# Patient Record
Sex: MALE | URBAN - METROPOLITAN AREA
[De-identification: names, ages, dates, MRNs, and addresses within clinical notes are randomized per-mention and may not be internally consistent; named-entity substitution may affect disease eponyms.]

---

## 2019-11-01 ENCOUNTER — OUTPATIENT (OUTPATIENT)
Dept: OUTPATIENT SERVICES | Facility: HOSPITAL | Age: 64
LOS: 1 days | End: 2019-11-01

## 2019-11-25 DIAGNOSIS — Z71.89 OTHER SPECIFIED COUNSELING: ICD-10-CM

## 2020-10-15 ENCOUNTER — APPOINTMENT (OUTPATIENT)
Dept: PULMONOLOGY | Facility: CLINIC | Age: 65
End: 2020-10-15
Payer: MEDICARE

## 2020-10-15 VITALS
BODY MASS INDEX: 29.72 KG/M2 | OXYGEN SATURATION: 97 % | HEIGHT: 67.13 IN | WEIGHT: 191.6 LBS | TEMPERATURE: 97.5 F | RESPIRATION RATE: 15 BRPM | HEART RATE: 92 BPM | DIASTOLIC BLOOD PRESSURE: 79 MMHG | SYSTOLIC BLOOD PRESSURE: 123 MMHG

## 2020-10-15 DIAGNOSIS — I10 ESSENTIAL (PRIMARY) HYPERTENSION: ICD-10-CM

## 2020-10-15 DIAGNOSIS — R06.83 SNORING: ICD-10-CM

## 2020-10-15 DIAGNOSIS — F32.9 ANXIETY DISORDER, UNSPECIFIED: ICD-10-CM

## 2020-10-15 DIAGNOSIS — Z78.9 OTHER SPECIFIED HEALTH STATUS: ICD-10-CM

## 2020-10-15 DIAGNOSIS — F41.9 ANXIETY DISORDER, UNSPECIFIED: ICD-10-CM

## 2020-10-15 DIAGNOSIS — E78.00 PURE HYPERCHOLESTEROLEMIA, UNSPECIFIED: ICD-10-CM

## 2020-10-15 DIAGNOSIS — F51.02 ADJUSTMENT INSOMNIA: ICD-10-CM

## 2020-10-15 DIAGNOSIS — E11.9 TYPE 2 DIABETES MELLITUS W/OUT COMPLICATIONS: ICD-10-CM

## 2020-10-15 PROBLEM — Z00.00 ENCOUNTER FOR PREVENTIVE HEALTH EXAMINATION: Status: ACTIVE | Noted: 2020-10-15

## 2020-10-15 PROCEDURE — 99204 OFFICE O/P NEW MOD 45 MIN: CPT

## 2020-10-15 RX ORDER — EMPAGLIFLOZIN 25 MG/1
25 TABLET, FILM COATED ORAL
Refills: 0 | Status: ACTIVE | COMMUNITY

## 2020-10-15 RX ORDER — SITAGLIPTIN AND METFORMIN HYDROCHLORIDE 100; 1000 MG/1; MG/1
100-1000 TABLET, FILM COATED, EXTENDED RELEASE ORAL
Refills: 0 | Status: ACTIVE | COMMUNITY

## 2020-10-15 RX ORDER — TRAZODONE HYDROCHLORIDE 50 MG/1
50 TABLET ORAL
Refills: 0 | Status: ACTIVE | COMMUNITY

## 2020-10-15 RX ORDER — INSULIN ASPART 100 [IU]/ML
(70-30) 100 INJECTION, SUSPENSION SUBCUTANEOUS
Refills: 0 | Status: ACTIVE | COMMUNITY

## 2020-10-15 RX ORDER — GABAPENTIN 300 MG/1
300 CAPSULE ORAL
Refills: 0 | Status: ACTIVE | COMMUNITY

## 2020-10-15 RX ORDER — MIRTAZAPINE 15 MG/1
15 TABLET, FILM COATED ORAL
Refills: 0 | Status: ACTIVE | COMMUNITY

## 2020-10-15 RX ORDER — ASPIRIN 81 MG
81 TABLET, DELAYED RELEASE (ENTERIC COATED) ORAL
Refills: 0 | Status: ACTIVE | COMMUNITY

## 2020-10-15 RX ORDER — HYDROXYZINE HYDROCHLORIDE 10 MG/1
10 TABLET ORAL
Refills: 0 | Status: ACTIVE | COMMUNITY

## 2020-10-15 RX ORDER — LOSARTAN POTASSIUM 25 MG/1
25 TABLET, FILM COATED ORAL
Refills: 0 | Status: ACTIVE | COMMUNITY

## 2020-10-15 RX ORDER — ATORVASTATIN CALCIUM 20 MG/1
20 TABLET, FILM COATED ORAL
Refills: 0 | Status: ACTIVE | COMMUNITY

## 2020-10-15 RX ORDER — CARVEDILOL 25 MG/1
25 TABLET, FILM COATED ORAL
Refills: 0 | Status: ACTIVE | COMMUNITY

## 2020-10-15 NOTE — DISCUSSION/SUMMARY
[FreeTextEntry1] : This is a 64 year old Azerbaijani speaking male for evaluation of possible sleep apnea. The patient has multiple signs and symptoms of sleep-disordered breathing include frequent nocturnal awakenings, nonrestorative sleep, witnessed apnea, loud snoring, and EDS. He also has worsening HTN and diabetes, CVD and is at high risk for a heart attack and stroke. He was referred to the [facility] for a diagnostic PSG. The ramifications of MELO and its potential therapeutic modalities were discussed with the patient. The patient was cautioned on the importance of avoiding drowsy driving. He will follow up with us after the PSG.\par \par After his sleep disordered breathing evaluation, we will discuss his insomnia which as of right now appears to be situational insomnia or severe depression. \par

## 2020-10-15 NOTE — HISTORY OF PRESENT ILLNESS
[Never] : never [Awakes Unrefreshed] : awakes unrefreshed [Daytime Somnolence] : daytime somnolence [Fatigue] : fatigue [Difficulty Initiating Sleep] : difficulty initiating sleep [Frequent Nocturnal Awakening] : frequent nocturnal awakening [Recent  Weight Gain] : recent  weight gain [Nonrestorative Sleep] : nonrestorative sleep [Snoring] : snoring [DIS] : difficulty initiating sleep [Witnessed Apneas] : witnessed apneas [Hypersomnolence] : hypersomnolence [DMS] : difficulty maintaining sleep [TextBox_4] : Mr. Fontana is a 64 year old [place]ian Speaking male with a significant pmhx of DM, HTN, CVD who comes in for evaluation of sleep disordered breathing. He has loud snoring, witnessed apnea, EDS, frequent nocturnal awakening and nonrestorative sleep. He would like to be evaluated for sleep disordered breathing. \par \par The patient is also experiencing insomnia where he only sleeps for 2-3 hours a night. This was precipitated by the death of a family member a couple of months ago due to COVID-19. Since that time, he has difficulty falling asleep at night but can doze off during the day. He states his overall mood has been depressed. He was given zolpidem for sleep which helped but his PDM stopped prescribing. He was referred to a psychiatrist who prescribed hydroxyzine, mirtazapine, and trazodone for sleep. He states he does not like trazodone as this makes him feel even more tired during the day. \par \par [place]- 35 years ago (negative TB)\par Psychiatrist: Sylvia Rivas\par ______________________________________________________________________________________ \par EPWORTH SLEEPINESS SCALE \par How likely are you to doze off or fall asleep in the situations described below, in contrast to feeling just tired? \par This refers to your usual way of life in recent times. \par Even if you haven't done some of these things recently, try to work out how they would have affected you. \par Use the following scale to choose one most appropriate number for each situation. \par \par 0 = Never would doze \par 1 = Slight chance of dozing \par 2 = Moderate chance of dozing \par 3 = High chance of dozing \par \par  \par 2........................................Sitting and reading \par 3........................................Watching TV \par 3........................................Sitting inactive in a public place (eg a theatre or a meeting) \par 3........................................As a passenger in a car for an hour without a break \par 3........................................Lying down to rest in the afternoon when circumstances permit \par 0........................................Sitting and talking to someone \par 2........................................Sitting quietly after lunch without alcohol \par 2........................................In a car, while stopped for a few minutes in traffic \par 18........................................TOTAL SCORE \par ______________________________________________________________________________________ \par \par  [TextBox_3] : 18

## 2020-10-15 NOTE — PHYSICAL EXAM
[No Acute Distress] : no acute distress [Enlarged Base of the Tongue] : enlarged base of the tongue [IV] : Mallampati Class: IV [Normal Appearance] : normal appearance [No Neck Mass] : no neck mass [Normal Rate/Rhythm] : normal rate/rhythm [Normal S1, S2] : normal s1, s2 [No Murmurs] : no murmurs [No Resp Distress] : no resp distress [Clear to Auscultation Bilaterally] : clear to auscultation bilaterally [Oriented x3] : oriented x3 [1+ Pitting] : 1+ pitting [Normal Affect] : normal affect [TextBox_105] : Mild clubbing noted on upper extremity digits

## 2020-10-15 NOTE — REVIEW OF SYSTEMS
[Fatigue] : fatigue [Recent Wt Gain (___ Lbs)] : ~T recent [unfilled] lb weight gain [Chronic Pain] : chronic pain [Numbness] : numbness [Anxiety] : anxiety [Depression] : depression [Negative] : Endocrine [TextBox_119] : lower extremities bilaterally

## 2021-04-06 ENCOUNTER — APPOINTMENT (OUTPATIENT)
Dept: ORTHOPEDIC SURGERY | Facility: CLINIC | Age: 66
End: 2021-04-06
Payer: MEDICARE

## 2021-04-06 DIAGNOSIS — M72.2 PLANTAR FASCIAL FIBROMATOSIS: ICD-10-CM

## 2021-04-06 DIAGNOSIS — S93.324A DISLOCATION OF TARSOMETATARSAL JOINT OF RIGHT FOOT, INITIAL ENCOUNTER: ICD-10-CM

## 2021-04-06 PROCEDURE — 99204 OFFICE O/P NEW MOD 45 MIN: CPT

## 2021-04-06 PROCEDURE — 99072 ADDL SUPL MATRL&STAF TM PHE: CPT

## 2021-04-06 PROCEDURE — 73630 X-RAY EXAM OF FOOT: CPT | Mod: RT

## 2021-04-06 PROCEDURE — 73610 X-RAY EXAM OF ANKLE: CPT | Mod: RT

## 2021-04-06 RX ORDER — PREGABALIN 100 MG/1
100 CAPSULE ORAL
Qty: 30 | Refills: 0 | Status: ACTIVE | COMMUNITY
Start: 2021-03-11

## 2021-04-06 RX ORDER — TRIAMCINOLONE ACETONIDE 1 MG/G
0.1 CREAM TOPICAL
Qty: 80 | Refills: 0 | Status: ACTIVE | COMMUNITY
Start: 2021-04-01

## 2021-04-06 RX ORDER — ERGOCALCIFEROL 1.25 MG/1
1.25 MG CAPSULE, LIQUID FILLED ORAL
Qty: 12 | Refills: 0 | Status: ACTIVE | COMMUNITY
Start: 2021-03-11

## 2021-04-06 RX ORDER — POLYVINYL ALCOHOL/POVIDONE 0.5%-0.6%
DROPS OPHTHALMIC (EYE)
Qty: 15 | Refills: 0 | Status: ACTIVE | COMMUNITY
Start: 2021-01-28

## 2021-04-06 RX ORDER — BLOOD SUGAR DIAGNOSTIC
STRIP MISCELLANEOUS
Qty: 100 | Refills: 0 | Status: ACTIVE | COMMUNITY
Start: 2020-11-02

## 2021-04-06 RX ORDER — LOSARTAN POTASSIUM 100 MG/1
100 TABLET, FILM COATED ORAL
Qty: 90 | Refills: 0 | Status: ACTIVE | COMMUNITY
Start: 2020-12-01

## 2021-04-06 RX ORDER — ZOLPIDEM TARTRATE 5 MG/1
5 TABLET ORAL
Qty: 30 | Refills: 0 | Status: ACTIVE | COMMUNITY
Start: 2020-12-03

## 2021-04-06 RX ORDER — ZOLPIDEM TARTRATE 10 MG/1
10 TABLET ORAL
Qty: 30 | Refills: 0 | Status: ACTIVE | COMMUNITY
Start: 2021-03-23

## 2021-04-06 RX ORDER — INSULIN ASPART 100 [IU]/ML
(70-30) 100 INJECTION, SUSPENSION SUBCUTANEOUS
Qty: 15 | Refills: 0 | Status: ACTIVE | COMMUNITY
Start: 2020-12-01

## 2021-04-06 RX ORDER — SITAGLIPTIN AND METFORMIN HYDROCHLORIDE 50; 1000 MG/1; MG/1
50-1000 TABLET, FILM COATED ORAL
Qty: 60 | Refills: 0 | Status: ACTIVE | COMMUNITY
Start: 2020-12-01

## 2021-04-06 RX ORDER — LANCETS 30 GAUGE
EACH MISCELLANEOUS
Qty: 300 | Refills: 0 | Status: ACTIVE | COMMUNITY
Start: 2021-03-02

## 2021-04-06 RX ORDER — ISOPROPYL ALCOHOL 70 ML/100ML
70 SWAB TOPICAL
Qty: 100 | Refills: 0 | Status: ACTIVE | COMMUNITY
Start: 2020-11-02

## 2021-04-06 RX ORDER — SILVER SULFADIAZINE 10 MG/G
1 CREAM TOPICAL
Qty: 85 | Refills: 0 | Status: ACTIVE | COMMUNITY
Start: 2021-04-01

## 2021-04-06 RX ORDER — POLYVINYL ALCOHOL, POVIDONE .5; .6 G/100ML; G/100ML
0.5-0.6 LIQUID OPHTHALMIC
Qty: 45 | Refills: 0 | Status: ACTIVE | COMMUNITY
Start: 2021-03-01

## 2021-04-06 RX ORDER — OMEGA-3/DHA/EPA/FISH OIL 300-1000MG
1000 CAPSULE ORAL
Qty: 120 | Refills: 0 | Status: ACTIVE | COMMUNITY
Start: 2020-11-02

## 2021-04-06 RX ORDER — CHLORTHALIDONE 50 MG/1
50 TABLET ORAL
Qty: 30 | Refills: 0 | Status: ACTIVE | COMMUNITY
Start: 2020-11-02

## 2021-04-06 RX ORDER — PEN NEEDLE, DIABETIC 29 G X1/2"
32G X 4 MM NEEDLE, DISPOSABLE MISCELLANEOUS
Qty: 100 | Refills: 0 | Status: ACTIVE | COMMUNITY
Start: 2020-11-30

## 2021-04-06 RX ORDER — MIRTAZAPINE 7.5 MG/1
7.5 TABLET, FILM COATED ORAL
Qty: 30 | Refills: 0 | Status: ACTIVE | COMMUNITY
Start: 2020-11-14

## 2021-04-06 RX ORDER — GABAPENTIN 100 MG/1
100 CAPSULE ORAL
Qty: 30 | Refills: 0 | Status: ACTIVE | COMMUNITY
Start: 2020-12-01

## 2021-04-06 RX ORDER — ASPIRIN ENTERIC COATED TABLETS 81 MG 81 MG/1
81 TABLET, DELAYED RELEASE ORAL
Qty: 90 | Refills: 0 | Status: ACTIVE | COMMUNITY
Start: 2021-03-01

## 2021-04-06 RX ORDER — OMEGA-3-ACID ETHYL ESTERS CAPSULES 1 G/1
1 CAPSULE, LIQUID FILLED ORAL
Qty: 120 | Refills: 0 | Status: ACTIVE | COMMUNITY
Start: 2020-12-01

## 2021-04-06 NOTE — HISTORY OF PRESENT ILLNESS
[de-identified] : CC RIGHT ankle\par \par HPI 65 year old M presents with gradual onset 1yr of constant pain in the medial and anterior right ankle without injury. The pain is worse and rated a 7 out of 10, described as strong without radiation. Pills makes the pain better and sleeping makes the pain worse. The patient denies associated symptoms of instability. The patient denies similar pain previously.  Pt would like an injection today. Reports DM, taking insulin and pills. \par \par The patient has tried the following treatments:\par Activity modification	+\par Ice/Compression  	-\par Braces    		-\par Nsaids    	                -\par Physical Therapy  	+ over 25 therapy sessions with some relief, 6mo prior \par Cortisone Injection	+ 3 injections, the last one being 1yr ago, 5 days 70%, 1 injection in the medial heel and one in the anterior ankle\par Arthroscopy		-\par \par \par Review of Systems is positive for the above musculoskeletal symptoms and is otherwise non-contributory for general, constitutional, psychiatric, neurologic, HEENT, cardiac, respiratory, gastrointestinal, reproductive, lymphatic, and dermatologic complaints.\par \par Consult by  \shira \par

## 2021-04-06 NOTE — PHYSICAL EXAM
[de-identified] : Physical Examination\par General: well nourished, in no acute distress, alert and oriented to person, place and time\par Psychiatric: normal mood and affect, no abnormal movements or speech patterns\par Eyes: vision intact - glasses\par Throat: no thyromegaly\par Lymph: no enlarged nodes, no lymphedema in extremity\par Respiratory: no wheezing, no shortness of breath with ambulation\par Cardiac: no cardiac leg swelling, 2+ peripheral pulses\par Neurology: normal gross sensation in extremities to light touch\par Abdomen: soft, non-tender, tympanic, no masses\par \par Musculoskeletal Examination\par Ambulation	+ antalgic gait, + assistive devices\par \par Ankle			Right			Left\par General\par 	Deformity       	swelling midfoot			normal	\par 	Skin/Edema   	normal			normal\par 	Erythema       	-			-\par 	Alignment      	pes planus			neutral\par Range of Motion\par 	Ankle Dorsiflex	15			10\par 	Ankle Plantarflex	35			35\par 	Inversion        	5			15\par 	Eversion		5			5\par Drawer\par 	ATFL		-			-\par 	CFL	                	-			-\par 	PTFL		-			-\par Palpation\par 	ATFL		-			-\par 	Medial Heel   	+			-\par 	Other		+ midfoot medial TMT			-\par Strength Examination/Atrophy\par 	EHL 		5+			5+\par 	FHL 		5+			5+\par 	Tibialis Anterior	5+			5+\par 	Achilles/Soleus	5+			5+\par Sensation\par 	Deep Peroneal	normal			normal\par 	Super Peroneal 	normal			normal\par 	Sural 		normal			normal\par 	Posterior Tibial 	normal			normal\par 	Saphenous    	normal			normal\par Pulses\par 	DP   		2+			2+\par \par \par  [de-identified] : 3 views of the right foot and 3 views of the right ankle\par Mild minimal osteoarthritic changes of the tibiotalar joint at the tip of the malleoli and anterior tibial plafond with moderate changes at the midfoot of the talonavicular joint\par There is severe destruction of the first and second TMT joints consistent with a Lisfranc injury with lateral translation of the second third fourth and fifth metatarsals

## 2021-04-06 NOTE — DISCUSSION/SUMMARY
[de-identified] :  # [id number]\par \par Right Lisfranc injury\par Possible right foot diabetic neuropathy\par Right plantar fascia pain\par \par I discussed my findings and history exam and radiology\par \par Arun and capable of treating plantar fascia pain surgically with a partial release of the medial portion of the plantar fascia I am concerned about performing the surgery the patient with a presented unstable midfoot with a chronic Lisfranc injury placing more strain on the plantar fascia given the loss of bony arch in the foot. Therefore recommend the patient follow up with a foot and ankle specialist to assess need for made for stabilization.\par \par Recommended patient obtained a CT scan to assess midfoot alignment\par \par The patient was prescribed Diclofenac PO non-steroidal anti-inflammatory medication. 50mg tablets twice daily to be taken for at least 1-2 weeks in a row and then PRN afterwards. Risks and benefits were discussed and include but not limited to renal damage and GI ulceration and bleeding.  They were advised to take with food to limit stomach upset as well as warned to stop the medication if worsening gastric pain or dizziness or other side effects. Also to immediately stop the medication and seek appropriate medical attention if any severe stomach ache, gastritis, black/red vomit, black/red stools or any other medical concern.\par \par \par \par Followup FA specialist

## 2021-05-27 ENCOUNTER — NON-APPOINTMENT (OUTPATIENT)
Age: 66
End: 2021-05-27

## 2021-06-29 ENCOUNTER — NON-APPOINTMENT (OUTPATIENT)
Age: 66
End: 2021-06-29

## 2021-08-21 ENCOUNTER — OUTPATIENT (OUTPATIENT)
Dept: OUTPATIENT SERVICES | Facility: HOSPITAL | Age: 66
LOS: 1 days | End: 2021-08-21
Payer: MEDICARE

## 2021-08-21 ENCOUNTER — APPOINTMENT (OUTPATIENT)
Dept: SLEEP CENTER | Facility: CLINIC | Age: 66
End: 2021-08-21
Payer: MEDICARE

## 2021-08-21 PROCEDURE — 95810 POLYSOM 6/> YRS 4/> PARAM: CPT | Mod: 26

## 2021-08-21 PROCEDURE — 95810 POLYSOM 6/> YRS 4/> PARAM: CPT

## 2021-08-23 DIAGNOSIS — G47.33 OBSTRUCTIVE SLEEP APNEA (ADULT) (PEDIATRIC): ICD-10-CM

## 2021-09-08 ENCOUNTER — NON-APPOINTMENT (OUTPATIENT)
Age: 66
End: 2021-09-08

## 2021-10-01 ENCOUNTER — NON-APPOINTMENT (OUTPATIENT)
Age: 66
End: 2021-10-01

## 2021-11-18 ENCOUNTER — APPOINTMENT (OUTPATIENT)
Dept: PULMONOLOGY | Facility: CLINIC | Age: 66
End: 2021-11-18
Payer: MEDICARE

## 2021-11-18 VITALS
SYSTOLIC BLOOD PRESSURE: 131 MMHG | HEIGHT: 67 IN | BODY MASS INDEX: 31.39 KG/M2 | HEART RATE: 85 BPM | TEMPERATURE: 97.8 F | WEIGHT: 200 LBS | DIASTOLIC BLOOD PRESSURE: 89 MMHG | RESPIRATION RATE: 16 BRPM

## 2021-11-18 DIAGNOSIS — G47.33 OBSTRUCTIVE SLEEP APNEA (ADULT) (PEDIATRIC): ICD-10-CM

## 2021-11-18 PROCEDURE — 99214 OFFICE O/P EST MOD 30 MIN: CPT

## 2021-11-18 PROCEDURE — 99072 ADDL SUPL MATRL&STAF TM PHE: CPT

## 2021-11-18 NOTE — PHYSICAL EXAM
[No Acute Distress] : no acute distress [Enlarged Base of the Tongue] : enlarged base of the tongue [IV] : Mallampati Class: IV [Normal Appearance] : normal appearance [No Neck Mass] : no neck mass [Normal Rate/Rhythm] : normal rate/rhythm [Normal S1, S2] : normal s1, s2 [No Murmurs] : no murmurs [No Resp Distress] : no resp distress [Clear to Auscultation Bilaterally] : clear to auscultation bilaterally [1+ Pitting] : 1+ pitting [Oriented x3] : oriented x3 [Normal Affect] : normal affect [TextBox_105] : Mild clubbing noted on upper extremity digits

## 2021-11-18 NOTE — HISTORY OF PRESENT ILLNESS
[Never] : never [Obstructive Sleep Apnea] : obstructive sleep apnea [Awakes Unrefreshed] : awakes unrefreshed [Awakes with Dry Mouth] : awakes with dry mouth [Daytime Somnolence] : daytime somnolence [Difficulty Maintaining Sleep] : difficulty maintaining sleep [Frequent Nocturnal Awakening] : frequent nocturnal awakening [Snoring] : snoring [Tired while Driving] : tired while driving [Witnessed Apneas] : witnessed apneas [DMS] : difficulty maintaining sleep [TextBox_4] : Mr. Fontana is a 66 year old [place]an Speaking male with a significant pmhx of DM, HTN, CVD who comes in for a follow up.  Patient had his in lab polysomnography in August 21, 2021 which found moderate obstructive sleep apnea with severe frequency during REM sleep.  The patient was counseled to undergo an in lab CPAP titration but was told by his primary care doctor that he needed a pulmonary evaluation prior to doing this.  The patient continues to have symptoms of sleep disordered breathing including loud snoring and witnessed apnea.  He continues to be suffering from insomnia which is likely caused by his obstructive sleep apnea.\par \par Of note:\par The patient is also experiencing insomnia where he only sleeps for 2-3 hours a night. This was precipitated by the death of a family member a couple of months ago due to COVID-19. Since that time, he has difficulty falling asleep at night but can doze off during the day. He states his overall mood has been depressed. He was given zolpidem for sleep which helped but his PDM stopped prescribing. He was referred to a psychiatrist who prescribed hydroxyzine, mirtazapine, and trazodone for sleep. He states he does not like trazodone as this makes him feel even more tired during the day. \par \par [place]- 35 years ago (negative TB)\par Psychiatrist: Sylvia Rivas\par ______________________________________________________________________________________ \par EPWORTH SLEEPINESS SCALE \par How likely are you to doze off or fall asleep in the situations described below, in contrast to feeling just tired? \par This refers to your usual way of life in recent times. \par Even if you haven't done some of these things recently, try to work out how they would have affected you. \par Use the following scale to choose one most appropriate number for each situation. \par \par 0 = Never would doze \par 1 = Slight chance of dozing \par 2 = Moderate chance of dozing \par 3 = High chance of dozing \par \par  \par 2........................................Sitting and reading \par 3........................................Watching TV \par 3........................................Sitting inactive in a public place (eg a theatre or a meeting) \par 3........................................As a passenger in a car for an hour without a break \par 3........................................Lying down to rest in the afternoon when circumstances permit \par 0........................................Sitting and talking to someone \par 2........................................Sitting quietly after lunch without alcohol \par 2........................................In a car, while stopped for a few minutes in traffic \par 18........................................TOTAL SCORE \par ______________________________________________________________________________________ \par \par  [DIS] : does not have difficulty initiating sleep

## 2021-11-18 NOTE — ASSESSMENT
[FreeTextEntry1] : This is a 66 year old German speaking male recently diagnosed with moderate obstructive sleep apnea here for follow-up.  Given that the patient saw me back in 2020 and has recently obtained his in lab polysomnography, he clearly is suffering from untreated MELO.  Given that the wait time for an in lab PAP titration and not until early 2022, I will order the patient an APAP machine.  He is agreeable to treatment. Equipment will be ordered through the DME provider. The compliance criteria were discussed and she was instructed to follow up with us within 2-3 months receiving the equipment.\par \par After he receives treatment for obstructive sleep apnea, we will discuss his insomnia which as of right now appears to be situational insomnia or severe depression. \par \par We will follow up with me in 2 to 3 months after he receives his APAP device.

## 2021-11-18 NOTE — REVIEW OF SYSTEMS
[Fatigue] : fatigue [Recent Wt Gain (___ Lbs)] : ~T recent [unfilled] lb weight gain [Chronic Pain] : chronic pain [Numbness] : numbness [Depression] : depression [Anxiety] : anxiety [Negative] : Endocrine [TextBox_119] : lower extremities bilaterally

## 2024-05-16 ENCOUNTER — OFFICE (OUTPATIENT)
Dept: URBAN - METROPOLITAN AREA CLINIC 103 | Facility: CLINIC | Age: 69
Setting detail: OPHTHALMOLOGY
End: 2024-05-16
Payer: MEDICAID

## 2024-05-16 DIAGNOSIS — E11.3293: ICD-10-CM

## 2024-05-16 DIAGNOSIS — H11.041: ICD-10-CM

## 2024-05-16 DIAGNOSIS — H40.013: ICD-10-CM

## 2024-05-16 PROBLEM — H25.13 CATARACT SENILE NUCLEAR SCLEROSIS; BOTH EYES: Status: ACTIVE | Noted: 2024-05-16

## 2024-05-16 PROBLEM — H35.033 HYPERTENSIVE RETINOPATHY; BOTH EYES: Status: ACTIVE | Noted: 2024-05-16

## 2024-05-16 PROBLEM — H52.7 REFRACTIVE ERROR ; BOTH EYES: Status: ACTIVE | Noted: 2024-05-16

## 2024-05-16 PROBLEM — H16.223 DRY EYE SYNDROME K SICCA; BOTH EYES: Status: ACTIVE | Noted: 2024-05-16

## 2024-05-16 PROBLEM — H11.152 PINGUECULA; LEFT EYE: Status: ACTIVE | Noted: 2024-05-16

## 2024-05-16 PROCEDURE — 92004 COMPRE OPH EXAM NEW PT 1/>: CPT | Performed by: OPHTHALMOLOGY

## 2024-05-16 PROCEDURE — 92250 FUNDUS PHOTOGRAPHY W/I&R: CPT | Performed by: OPHTHALMOLOGY

## 2024-05-16 PROCEDURE — 92020 GONIOSCOPY: CPT | Performed by: OPHTHALMOLOGY

## 2024-05-16 ASSESSMENT — CONFRONTATIONAL VISUAL FIELD TEST (CVF)
OD_FINDINGS: FULL
OS_FINDINGS: FULL

## 2024-10-14 ENCOUNTER — NON-APPOINTMENT (OUTPATIENT)
Age: 69
End: 2024-10-14

## 2024-10-14 DIAGNOSIS — M12.50 TRAUMATIC ARTHROPATHY, UNSPECIFIED SITE: ICD-10-CM

## 2024-10-14 DIAGNOSIS — E11.9 TYPE 2 DIABETES MELLITUS W/OUT COMPLICATIONS: ICD-10-CM

## 2024-10-14 DIAGNOSIS — B35.1 TINEA UNGUIUM: ICD-10-CM

## 2024-10-14 RX ORDER — HYDROCORTISONE 1 %
12 CREAM (GRAM) TOPICAL
Refills: 0 | Status: ACTIVE | COMMUNITY

## 2024-10-14 RX ORDER — CLOTRIMAZOLE 10 MG/G
1 CREAM TOPICAL
Refills: 0 | Status: ACTIVE | COMMUNITY

## 2024-10-14 RX ORDER — DICLOFENAC SODIUM 10 MG/G
1 GEL TOPICAL
Refills: 0 | Status: ACTIVE | COMMUNITY

## 2024-10-14 RX ORDER — KETOCONAZOLE 20 MG/G
2 CREAM TOPICAL
Refills: 0 | Status: ACTIVE | COMMUNITY

## 2024-10-14 RX ORDER — CELECOXIB 200 MG/1
200 CAPSULE ORAL
Refills: 0 | Status: ACTIVE | COMMUNITY

## 2024-11-13 ENCOUNTER — APPOINTMENT (OUTPATIENT)
Dept: UROLOGY | Facility: CLINIC | Age: 69
End: 2024-11-13

## 2024-11-19 ENCOUNTER — APPOINTMENT (OUTPATIENT)
Age: 69
End: 2024-11-19

## 2024-11-19 ENCOUNTER — APPOINTMENT (OUTPATIENT)
Dept: UROLOGY | Facility: CLINIC | Age: 69
End: 2024-11-19
Payer: MEDICARE

## 2024-11-19 VITALS
OXYGEN SATURATION: 97 % | HEART RATE: 76 BPM | SYSTOLIC BLOOD PRESSURE: 121 MMHG | HEIGHT: 67 IN | RESPIRATION RATE: 16 BRPM | BODY MASS INDEX: 27.31 KG/M2 | WEIGHT: 174 LBS | DIASTOLIC BLOOD PRESSURE: 74 MMHG

## 2024-11-19 PROCEDURE — 99203 OFFICE O/P NEW LOW 30 MIN: CPT

## 2024-11-21 ENCOUNTER — OFFICE (OUTPATIENT)
Dept: URBAN - METROPOLITAN AREA CLINIC 103 | Facility: CLINIC | Age: 69
Setting detail: OPHTHALMOLOGY
End: 2024-11-21
Payer: MEDICAID

## 2024-11-21 DIAGNOSIS — H16.223: ICD-10-CM

## 2024-11-21 DIAGNOSIS — H01.004: ICD-10-CM

## 2024-11-21 DIAGNOSIS — H40.013: ICD-10-CM

## 2024-11-21 DIAGNOSIS — E11.3293: ICD-10-CM

## 2024-11-21 DIAGNOSIS — H11.041: ICD-10-CM

## 2024-11-21 DIAGNOSIS — H25.13: ICD-10-CM

## 2024-11-21 DIAGNOSIS — H01.001: ICD-10-CM

## 2024-11-21 DIAGNOSIS — H11.152: ICD-10-CM

## 2024-11-21 PROCEDURE — 92012 INTRM OPH EXAM EST PATIENT: CPT | Performed by: OPHTHALMOLOGY

## 2024-11-21 PROCEDURE — 76514 ECHO EXAM OF EYE THICKNESS: CPT | Performed by: OPHTHALMOLOGY

## 2024-11-21 PROCEDURE — 92134 CPTRZ OPH DX IMG PST SGM RTA: CPT | Performed by: OPHTHALMOLOGY

## 2024-11-21 ASSESSMENT — PACHYMETRY
OS_CT_CORRECTION: -1
OD_CT_UM: 562
OS_CT_UM: 560
OD_CT_CORRECTION: -1

## 2024-11-21 ASSESSMENT — CORNEAL PTERYGIUM: OD_PTERYGIUM: NASAL

## 2024-11-21 ASSESSMENT — VISUAL ACUITY
OD_BCVA: 20/50-1
OS_BCVA: 20/60

## 2024-11-21 ASSESSMENT — REFRACTION_MANIFEST
OD_SPHERE: +0.75
OS_AXIS: 087
OD_AXIS: 052
OS_SPHERE: +1.00
OS_VA1: 20/20-
OD_VA1: 20/25-
OS_ADD: +2.75
OD_ADD: +2.75
OD_CYLINDER: -0.25
OS_CYLINDER: -0.50

## 2024-11-21 ASSESSMENT — KERATOMETRY
OD_K2POWER_DIOPTERS: 44.00
OD_AXISANGLE_DEGREES: 117
OS_AXISANGLE_DEGREES: 046
OS_K2POWER_DIOPTERS: 44.25
OD_K1POWER_DIOPTERS: 43.75
OS_K1POWER_DIOPTERS: 44.00

## 2024-11-21 ASSESSMENT — REFRACTION_AUTOREFRACTION
OS_SPHERE: +2.00
OD_CYLINDER: -0.75
OD_AXIS: 056
OS_AXIS: 089
OS_CYLINDER: -1.25
OD_SPHERE: +1.25

## 2024-11-21 ASSESSMENT — CONFRONTATIONAL VISUAL FIELD TEST (CVF)
OS_FINDINGS: FULL
OD_FINDINGS: FULL

## 2024-11-21 ASSESSMENT — SUPERFICIAL PUNCTATE KERATITIS (SPK)
OS_SPK: T
OD_SPK: T

## 2024-11-21 ASSESSMENT — LID EXAM ASSESSMENTS
OS_BLEPHARITIS: LUL 1+
OD_BLEPHARITIS: RUL 1+

## 2024-11-21 ASSESSMENT — TONOMETRY: OD_IOP_MMHG: 18

## 2024-11-25 ENCOUNTER — APPOINTMENT (OUTPATIENT)
Age: 69
End: 2024-11-25
Payer: MEDICARE

## 2024-11-25 DIAGNOSIS — B35.1 TINEA UNGUIUM: ICD-10-CM

## 2024-11-25 DIAGNOSIS — E11.9 TYPE 2 DIABETES MELLITUS W/OUT COMPLICATIONS: ICD-10-CM

## 2024-11-25 PROCEDURE — 11721 DEBRIDE NAIL 6 OR MORE: CPT

## 2024-11-25 PROCEDURE — 99203 OFFICE O/P NEW LOW 30 MIN: CPT | Mod: 25

## 2024-11-25 RX ORDER — CLOTRIMAZOLE 10 MG/G
1 CREAM TOPICAL TWICE DAILY
Qty: 1 | Refills: 3 | Status: ACTIVE | COMMUNITY
Start: 2024-11-25 | End: 1900-01-01

## 2024-11-25 RX ORDER — DICLOFENAC SODIUM 10 MG/G
1 GEL TOPICAL
Qty: 1 | Refills: 2 | Status: ACTIVE | COMMUNITY
Start: 2024-11-25 | End: 1900-01-01

## 2025-01-28 DIAGNOSIS — L85.3 XEROSIS CUTIS: ICD-10-CM

## 2025-01-28 RX ORDER — AMMONIUM LACTATE 12 %
12 CREAM (GRAM) TOPICAL TWICE DAILY
Qty: 90 | Refills: 3 | Status: ACTIVE | COMMUNITY
Start: 2025-01-28 | End: 1900-01-01

## 2025-01-28 RX ORDER — DICLOFENAC SODIUM 10 MG/G
1 GEL TOPICAL
Qty: 1 | Refills: 1 | Status: ACTIVE | COMMUNITY
Start: 2025-01-28 | End: 1900-01-01

## 2025-02-25 ENCOUNTER — APPOINTMENT (OUTPATIENT)
Age: 70
End: 2025-02-25
Payer: MEDICARE

## 2025-02-25 VITALS — WEIGHT: 196 LBS | BODY MASS INDEX: 30.76 KG/M2 | HEIGHT: 67 IN

## 2025-02-25 DIAGNOSIS — M65.979 UNSPECIFIED SYNOVITIS AND TENOSYNOVITIS, UNSPECIFIED ANK/FT: ICD-10-CM

## 2025-02-25 DIAGNOSIS — B35.1 TINEA UNGUIUM: ICD-10-CM

## 2025-02-25 DIAGNOSIS — E11.42 TYPE 2 DIABETES MELLITUS WITH DIABETIC POLYNEUROPATHY: ICD-10-CM

## 2025-02-25 DIAGNOSIS — E11.9 TYPE 2 DIABETES MELLITUS W/OUT COMPLICATIONS: ICD-10-CM

## 2025-02-25 PROCEDURE — 76942 ECHO GUIDE FOR BIOPSY: CPT

## 2025-02-25 PROCEDURE — 11721 DEBRIDE NAIL 6 OR MORE: CPT | Mod: 59

## 2025-02-25 PROCEDURE — 99213 OFFICE O/P EST LOW 20 MIN: CPT | Mod: 25

## 2025-02-25 PROCEDURE — 20550 NJX 1 TENDON SHEATH/LIGAMENT: CPT | Mod: RT

## 2025-05-05 ENCOUNTER — NON-APPOINTMENT (OUTPATIENT)
Age: 70
End: 2025-05-05

## 2025-05-06 ENCOUNTER — APPOINTMENT (OUTPATIENT)
Age: 70
End: 2025-05-06
Payer: MEDICARE

## 2025-05-06 VITALS — HEIGHT: 67 IN | WEIGHT: 197 LBS | BODY MASS INDEX: 30.92 KG/M2

## 2025-05-06 DIAGNOSIS — E11.42 TYPE 2 DIABETES MELLITUS WITH DIABETIC POLYNEUROPATHY: ICD-10-CM

## 2025-05-06 DIAGNOSIS — M72.2 PLANTAR FASCIAL FIBROMATOSIS: ICD-10-CM

## 2025-05-06 DIAGNOSIS — B35.1 TINEA UNGUIUM: ICD-10-CM

## 2025-05-06 DIAGNOSIS — M65.979 UNSPECIFIED SYNOVITIS AND TENOSYNOVITIS, UNSPECIFIED ANK/FT: ICD-10-CM

## 2025-05-06 DIAGNOSIS — M19.071 PRIMARY OSTEOARTHRITIS, RIGHT ANKLE AND FOOT: ICD-10-CM

## 2025-05-06 PROCEDURE — 11721 DEBRIDE NAIL 6 OR MORE: CPT | Mod: Q8

## 2025-05-06 PROCEDURE — 99213 OFFICE O/P EST LOW 20 MIN: CPT | Mod: 25

## 2025-05-06 RX ORDER — DICLOFENAC POTASSIUM 50 MG/1
50 TABLET, COATED ORAL 3 TIMES DAILY
Qty: 60 | Refills: 0 | Status: ACTIVE | COMMUNITY
Start: 2025-05-06 | End: 2025-05-26

## 2025-05-15 ENCOUNTER — OFFICE (OUTPATIENT)
Dept: URBAN - METROPOLITAN AREA CLINIC 103 | Facility: CLINIC | Age: 70
Setting detail: OPHTHALMOLOGY
End: 2025-05-15
Payer: MEDICARE

## 2025-05-15 DIAGNOSIS — H11.152: ICD-10-CM

## 2025-05-15 DIAGNOSIS — H40.013: ICD-10-CM

## 2025-05-15 DIAGNOSIS — H11.041: ICD-10-CM

## 2025-05-15 DIAGNOSIS — H16.223: ICD-10-CM

## 2025-05-15 DIAGNOSIS — Z79.84: ICD-10-CM

## 2025-05-15 DIAGNOSIS — H25.13: ICD-10-CM

## 2025-05-15 DIAGNOSIS — H01.004: ICD-10-CM

## 2025-05-15 DIAGNOSIS — H01.001: ICD-10-CM

## 2025-05-15 DIAGNOSIS — E11.3393: ICD-10-CM

## 2025-05-15 PROCEDURE — 92014 COMPRE OPH EXAM EST PT 1/>: CPT | Performed by: OPHTHALMOLOGY

## 2025-05-15 PROCEDURE — 92250 FUNDUS PHOTOGRAPHY W/I&R: CPT | Performed by: OPHTHALMOLOGY

## 2025-05-15 ASSESSMENT — TONOMETRY
OD_IOP_MMHG: 19
OS_IOP_MMHG: 20

## 2025-05-15 ASSESSMENT — REFRACTION_AUTOREFRACTION
OS_AXIS: 081
OD_CYLINDER: -0.75
OS_CYLINDER: -1.25
OS_SPHERE: +1.75
OD_AXIS: 052
OD_SPHERE: +1.50

## 2025-05-15 ASSESSMENT — CONFRONTATIONAL VISUAL FIELD TEST (CVF)
OD_FINDINGS: FULL
OS_FINDINGS: FULL

## 2025-05-15 ASSESSMENT — SUPERFICIAL PUNCTATE KERATITIS (SPK)
OD_SPK: T
OS_SPK: T

## 2025-05-15 ASSESSMENT — VISUAL ACUITY
OD_BCVA: 20/30-2
OS_BCVA: 20/30

## 2025-05-15 ASSESSMENT — KERATOMETRY
OD_K2POWER_DIOPTERS: 43.50
OS_K1POWER_DIOPTERS: 44.25
OS_K2POWER_DIOPTERS: 44.25
OD_K1POWER_DIOPTERS: 43.50

## 2025-05-15 ASSESSMENT — PACHYMETRY
OS_CT_UM: 560
OS_CT_CORRECTION: -1
OD_CT_UM: 562
OD_CT_CORRECTION: -1

## 2025-05-15 ASSESSMENT — CORNEAL PTERYGIUM: OD_PTERYGIUM: NASAL

## 2025-05-15 ASSESSMENT — LID EXAM ASSESSMENTS
OS_BLEPHARITIS: LUL 1+
OD_BLEPHARITIS: RUL 1+

## 2025-05-15 ASSESSMENT — REFRACTION_MANIFEST
OS_AXIS: 087
OS_VA1: 20/20-
OS_CYLINDER: -0.50
OD_AXIS: 052
OD_CYLINDER: -0.25
OD_ADD: +2.75
OD_SPHERE: +0.75
OS_SPHERE: +1.00
OS_ADD: +2.75
OD_VA1: 20/25-

## 2025-05-26 ENCOUNTER — RX RENEWAL (OUTPATIENT)
Age: 70
End: 2025-05-26

## 2025-06-09 ENCOUNTER — RX RENEWAL (OUTPATIENT)
Age: 70
End: 2025-06-09

## 2025-08-12 ENCOUNTER — APPOINTMENT (OUTPATIENT)
Age: 70
End: 2025-08-12
Payer: MEDICARE

## 2025-08-12 DIAGNOSIS — E11.42 TYPE 2 DIABETES MELLITUS WITH DIABETIC POLYNEUROPATHY: ICD-10-CM

## 2025-08-12 DIAGNOSIS — M72.2 PLANTAR FASCIAL FIBROMATOSIS: ICD-10-CM

## 2025-08-12 DIAGNOSIS — M19.071 PRIMARY OSTEOARTHRITIS, RIGHT ANKLE AND FOOT: ICD-10-CM

## 2025-08-12 DIAGNOSIS — M65.979 UNSPECIFIED SYNOVITIS AND TENOSYNOVITIS, UNSPECIFIED ANK/FT: ICD-10-CM

## 2025-08-12 DIAGNOSIS — E11.9 TYPE 2 DIABETES MELLITUS W/OUT COMPLICATIONS: ICD-10-CM

## 2025-08-12 DIAGNOSIS — B35.1 TINEA UNGUIUM: ICD-10-CM

## 2025-08-12 PROCEDURE — 99213 OFFICE O/P EST LOW 20 MIN: CPT | Mod: 25

## 2025-08-12 PROCEDURE — 11721 DEBRIDE NAIL 6 OR MORE: CPT | Mod: Q8

## 2025-08-12 PROCEDURE — 73630 X-RAY EXAM OF FOOT: CPT
